# Patient Record
Sex: FEMALE | Race: ASIAN | NOT HISPANIC OR LATINO | URBAN - METROPOLITAN AREA
[De-identification: names, ages, dates, MRNs, and addresses within clinical notes are randomized per-mention and may not be internally consistent; named-entity substitution may affect disease eponyms.]

---

## 2017-05-08 ENCOUNTER — COMMUNICATION - HEALTHEAST (OUTPATIENT)
Dept: ADMINISTRATIVE | Facility: CLINIC | Age: 32
End: 2017-05-08

## 2017-05-08 ENCOUNTER — AMBULATORY - HEALTHEAST (OUTPATIENT)
Dept: ENDOCRINOLOGY | Facility: CLINIC | Age: 32
End: 2017-05-08

## 2017-06-03 ENCOUNTER — HOSPITAL ENCOUNTER (OUTPATIENT)
Dept: LABOR AND DELIVERY | Facility: CLINIC | Age: 32
Discharge: HOME | End: 2017-06-03

## 2017-06-08 ENCOUNTER — HOSPITAL ENCOUNTER (OUTPATIENT)
Dept: LABOR AND DELIVERY | Facility: CLINIC | Age: 32
Discharge: HOME | End: 2017-06-08

## 2017-06-08 ENCOUNTER — RECORDS - HEALTHEAST (OUTPATIENT)
Dept: ADMINISTRATIVE | Facility: OTHER | Age: 32
End: 2017-06-08

## 2017-06-09 ENCOUNTER — ANESTHESIA - HEALTHEAST (OUTPATIENT)
Dept: ADMINISTRATIVE | Facility: OTHER | Age: 32
End: 2017-06-09

## 2017-06-11 ENCOUNTER — HOME CARE/HOSPICE - HEALTHEAST (OUTPATIENT)
Dept: HOME HEALTH SERVICES | Facility: HOME HEALTH | Age: 32
End: 2017-06-11

## 2021-06-10 NOTE — PROGRESS NOTES
Spoke with Dr. Strong this morning regarding pt.  I was consulted when this pt was admitted and found to be a gestational diabetic.  In perusal of her blood glucose readings, she is not yet in need of any insulin.  She continues to have readings done fasting, as well as 1 hour post prandially.  She is maintaining an FBS of </= 95 and 1 hour post prandial readings of </= 140.  She is currently on a diabetic diet, and Dr Nunez and I agree that this is not necessary.  She will be transitioned to a regular diet.  If her BG readings increase, we will consider insulin.  The Kate RAMIREZ, who works at [facility] is involved and has also been consulted and is providing input regarding [facility]'s diet and treatment. We will continue to monitor and provide guidance as necessary.

## 2021-06-11 NOTE — ANESTHESIA PREPROCEDURE EVALUATION
Anesthesia Evaluation      Patient summary reviewed   No history of anesthetic complications     Airway   Mallampati: II  Neck ROM: full   Pulmonary - negative ROS and normal exam    breath sounds clear to auscultation                         Cardiovascular - negative ROS  (-) murmur  Rhythm: regular  Rate: normal,    no murmur      Neuro/Psych - negative ROS     Endo/Other    (+) diabetes mellitus,      Comments: GDM    GI/Hepatic/Renal    (+) GERD,             Dental - normal exam                        Anesthesia Plan  Planned anesthetic: epidural    ASA 2   Induction: intravenous   Anesthetic plan and risks discussed with: patient  Anesthesia plan special considerations: rapid sequence induction, increased risk of difficult airway, antiemetics,   Post-op plan: routine recovery

## 2021-06-11 NOTE — ANESTHESIA PROCEDURE NOTES
Epidural Block    Patient location during procedure: OB  Time Called: 6/9/2017 10:19 AM  Reason for Block:labor epidural  Staffing:  Performing  Anesthesiologist: KATE REARDON  Preanesthetic Checklist  Completed: patient identified, risks, benefits, and alternatives discussed, timeout performed, consent obtained, at patient's request, airway assessed, oxygen available, suction available, emergency drugs available and hand hygiene performed  Procedure  Patient position: sitting  Prep: ChloraPrep and site prepped and draped  Patient monitoring: continuous pulse oximetry, heart rate and blood pressure  Approach: midline  Location: L3-L4  Injection technique: DAWOOD saline  Number of Attempts:2  Needle  Needle type: Piqniqsilvestre   Needle gauge: 18 G     Catheter in Space: 5  Assessment  Sensory level:      Events: blood aspirated     Additional Notes:  Catheter removed and replaced on first attempt. First cath also placed on first attempt.

## 2021-06-11 NOTE — PROGRESS NOTES
Patient had a repeat BPP today and the results were 8/8. Dr Demarco informed of these results and no NST needed today. Patient will repeat BPP and NST on 6/5/17

## 2021-06-11 NOTE — ANESTHESIA POSTPROCEDURE EVALUATION
Patient: Chloe Forte  * No procedures listed *  Anesthesia type: No value filed.    Patient location: Labor and Delivery  Last vitals:   Vitals:    06/10/17 0800   BP: (!) 80/53   Pulse: 87   Resp: 16   Temp: 36.8  C (98.2  F)   SpO2:      Post vital signs: stable  Level of consciousness: awake, alert and oriented  Post-anesthesia pain: pain controlled  Post-anesthesia nausea and vomiting: no  Pulmonary: unassisted, return to baseline  Cardiovascular: stable and blood pressure at baseline  Hydration: adequate  Anesthetic events: no    Additional Notes:  Good analgesia with labor epidural.  No problems.  No follow up necessary.

## 2021-06-15 PROBLEM — O99.323 HIGH RISK PREGNANCY DUE TO MATERNAL DRUG ABUSE IN THIRD TRIMESTER (H): Status: ACTIVE | Noted: 2017-05-01

## 2021-06-15 PROBLEM — O24.419 GESTATIONAL DIABETES MELLITUS (GDM) IN THIRD TRIMESTER: Status: ACTIVE | Noted: 2017-05-04

## 2021-06-15 PROBLEM — E83.42 HYPOMAGNESEMIA: Status: ACTIVE | Noted: 2017-05-01

## 2021-06-15 PROBLEM — F19.10 HIGH RISK PREGNANCY DUE TO MATERNAL DRUG ABUSE IN THIRD TRIMESTER (H): Status: ACTIVE | Noted: 2017-05-01

## 2021-06-15 PROBLEM — F15.20 SEVERE METHAMPHETAMINE USE DISORDER (H): Status: ACTIVE | Noted: 2017-04-28

## 2021-06-15 PROBLEM — Z60.9 SOCIAL PROBLEM: Status: ACTIVE | Noted: 2017-05-01

## 2021-06-16 PROBLEM — Z34.90 PREGNANT: Status: ACTIVE | Noted: 2017-06-08
